# Patient Record
Sex: MALE | Race: WHITE | Employment: OTHER | URBAN - METROPOLITAN AREA
[De-identification: names, ages, dates, MRNs, and addresses within clinical notes are randomized per-mention and may not be internally consistent; named-entity substitution may affect disease eponyms.]

---

## 2018-09-24 ENCOUNTER — HOSPITAL ENCOUNTER (OUTPATIENT)
Dept: CT IMAGING | Facility: HOSPITAL | Age: 78
Discharge: HOME | End: 2018-09-24
Attending: PHYSICIAN ASSISTANT
Payer: MEDICARE

## 2018-09-24 DIAGNOSIS — M25.561 PAIN IN RIGHT KNEE: ICD-10-CM

## 2018-09-24 DIAGNOSIS — M17.11 UNILATERAL PRIMARY OSTEOARTHRITIS, RIGHT KNEE: ICD-10-CM

## 2018-09-24 PROCEDURE — 73700 CT LOWER EXTREMITY W/O DYE: CPT | Performed by: PHYSICIAN ASSISTANT

## 2018-10-25 RX ORDER — MULTIVIT-MIN/IRON/FOLIC ACID/K 18-600-40
1 CAPSULE ORAL DAILY
COMMUNITY

## 2018-10-26 ENCOUNTER — LAB ENCOUNTER (OUTPATIENT)
Dept: LAB | Facility: HOSPITAL | Age: 78
End: 2018-10-26
Attending: ORTHOPAEDIC SURGERY
Payer: MEDICARE

## 2018-10-26 DIAGNOSIS — Z01.818 PREOP TESTING: ICD-10-CM

## 2018-10-26 PROCEDURE — 86901 BLOOD TYPING SEROLOGIC RH(D): CPT

## 2018-10-26 PROCEDURE — 87641 MR-STAPH DNA AMP PROBE: CPT

## 2018-10-26 PROCEDURE — 86900 BLOOD TYPING SEROLOGIC ABO: CPT

## 2018-10-26 PROCEDURE — 36415 COLL VENOUS BLD VENIPUNCTURE: CPT

## 2018-10-26 PROCEDURE — 86850 RBC ANTIBODY SCREEN: CPT

## 2018-11-03 ENCOUNTER — LAB ENCOUNTER (OUTPATIENT)
Dept: LAB | Facility: HOSPITAL | Age: 78
DRG: 470 | End: 2018-11-03
Attending: ORTHOPAEDIC SURGERY
Payer: MEDICARE

## 2018-11-03 DIAGNOSIS — Z01.818 PREOP TESTING: ICD-10-CM

## 2018-11-03 PROCEDURE — 85025 COMPLETE CBC W/AUTO DIFF WBC: CPT

## 2018-11-03 PROCEDURE — 80053 COMPREHEN METABOLIC PANEL: CPT

## 2018-11-03 PROCEDURE — 36415 COLL VENOUS BLD VENIPUNCTURE: CPT

## 2018-11-05 ENCOUNTER — HOSPITAL ENCOUNTER (INPATIENT)
Facility: HOSPITAL | Age: 78
LOS: 7 days | Discharge: NURSING, REHAB OR LONG-TERM CARE | DRG: 470 | End: 2018-11-12
Attending: ORTHOPAEDIC SURGERY | Admitting: ORTHOPAEDIC SURGERY
Payer: MEDICARE

## 2018-11-05 ENCOUNTER — ANESTHESIA EVENT (OUTPATIENT)
Dept: SURGERY | Facility: HOSPITAL | Age: 78
DRG: 470 | End: 2018-11-05
Payer: MEDICARE

## 2018-11-05 ENCOUNTER — APPOINTMENT (OUTPATIENT)
Dept: GENERAL RADIOLOGY | Facility: HOSPITAL | Age: 78
DRG: 470 | End: 2018-11-05
Attending: ORTHOPAEDIC SURGERY
Payer: MEDICARE

## 2018-11-05 ENCOUNTER — ANESTHESIA (OUTPATIENT)
Dept: SURGERY | Facility: HOSPITAL | Age: 78
DRG: 470 | End: 2018-11-05
Payer: MEDICARE

## 2018-11-05 DIAGNOSIS — Z01.818 PREOP TESTING: Primary | ICD-10-CM

## 2018-11-05 DIAGNOSIS — M17.11 OSTEOARTHRITIS OF RIGHT KNEE: ICD-10-CM

## 2018-11-05 PROCEDURE — 73560 X-RAY EXAM OF KNEE 1 OR 2: CPT | Performed by: ORTHOPAEDIC SURGERY

## 2018-11-05 PROCEDURE — 0SRC0J9 REPLACEMENT OF RIGHT KNEE JOINT WITH SYNTHETIC SUBSTITUTE, CEMENTED, OPEN APPROACH: ICD-10-PCS | Performed by: ORTHOPAEDIC SURGERY

## 2018-11-05 DEVICE — P.F.C. SIGMA OVAL DOME PATELLA 3-PEG 41MM CEMENTED
Type: IMPLANTABLE DEVICE | Site: KNEE | Status: FUNCTIONAL
Brand: P.F.C. SIGMA

## 2018-11-05 RX ORDER — POLYETHYLENE GLYCOL 3350 17 G/17G
17 POWDER, FOR SOLUTION ORAL DAILY PRN
Status: DISCONTINUED | OUTPATIENT
Start: 2018-11-05 | End: 2018-11-12

## 2018-11-05 RX ORDER — DOCUSATE SODIUM 100 MG/1
100 CAPSULE, LIQUID FILLED ORAL 2 TIMES DAILY
Status: DISCONTINUED | OUTPATIENT
Start: 2018-11-05 | End: 2018-11-12

## 2018-11-05 RX ORDER — CYCLOBENZAPRINE HCL 10 MG
10 TABLET ORAL EVERY 8 HOURS PRN
Status: DISCONTINUED | OUTPATIENT
Start: 2018-11-05 | End: 2018-11-08

## 2018-11-05 RX ORDER — ATORVASTATIN CALCIUM 40 MG/1
40 TABLET, FILM COATED ORAL DAILY
Status: DISCONTINUED | OUTPATIENT
Start: 2018-11-05 | End: 2018-11-12

## 2018-11-05 RX ORDER — VENLAFAXINE HYDROCHLORIDE 150 MG/1
150 CAPSULE, EXTENDED RELEASE ORAL DAILY
Status: DISCONTINUED | OUTPATIENT
Start: 2018-11-05 | End: 2018-11-12

## 2018-11-05 RX ORDER — HALOPERIDOL 5 MG/ML
0.25 INJECTION INTRAMUSCULAR ONCE AS NEEDED
Status: DISCONTINUED | OUTPATIENT
Start: 2018-11-05 | End: 2018-11-05 | Stop reason: HOSPADM

## 2018-11-05 RX ORDER — ONDANSETRON 2 MG/ML
INJECTION INTRAMUSCULAR; INTRAVENOUS AS NEEDED
Status: DISCONTINUED | OUTPATIENT
Start: 2018-11-05 | End: 2018-11-05 | Stop reason: SURG

## 2018-11-05 RX ORDER — HYDROCODONE BITARTRATE AND ACETAMINOPHEN 5; 325 MG/1; MG/1
1 TABLET ORAL AS NEEDED
Status: DISCONTINUED | OUTPATIENT
Start: 2018-11-05 | End: 2018-11-05 | Stop reason: HOSPADM

## 2018-11-05 RX ORDER — SODIUM CHLORIDE 9 MG/ML
INJECTION, SOLUTION INTRAVENOUS CONTINUOUS
Status: DISCONTINUED | OUTPATIENT
Start: 2018-11-05 | End: 2018-11-06

## 2018-11-05 RX ORDER — MORPHINE SULFATE 4 MG/ML
4 INJECTION, SOLUTION INTRAMUSCULAR; INTRAVENOUS EVERY 10 MIN PRN
Status: DISCONTINUED | OUTPATIENT
Start: 2018-11-05 | End: 2018-11-05 | Stop reason: HOSPADM

## 2018-11-05 RX ORDER — METOCLOPRAMIDE HYDROCHLORIDE 5 MG/ML
10 INJECTION INTRAMUSCULAR; INTRAVENOUS EVERY 6 HOURS PRN
Status: ACTIVE | OUTPATIENT
Start: 2018-11-05 | End: 2018-11-07

## 2018-11-05 RX ORDER — FAMOTIDINE 10 MG/ML
20 INJECTION, SOLUTION INTRAVENOUS 2 TIMES DAILY
Status: DISCONTINUED | OUTPATIENT
Start: 2018-11-05 | End: 2018-11-12

## 2018-11-05 RX ORDER — SODIUM PHOSPHATE, DIBASIC AND SODIUM PHOSPHATE, MONOBASIC 7; 19 G/133ML; G/133ML
1 ENEMA RECTAL ONCE AS NEEDED
Status: DISCONTINUED | OUTPATIENT
Start: 2018-11-05 | End: 2018-11-12

## 2018-11-05 RX ORDER — NALOXONE HYDROCHLORIDE 0.4 MG/ML
80 INJECTION, SOLUTION INTRAMUSCULAR; INTRAVENOUS; SUBCUTANEOUS AS NEEDED
Status: DISCONTINUED | OUTPATIENT
Start: 2018-11-05 | End: 2018-11-05 | Stop reason: HOSPADM

## 2018-11-05 RX ORDER — ROCURONIUM BROMIDE 10 MG/ML
INJECTION, SOLUTION INTRAVENOUS AS NEEDED
Status: DISCONTINUED | OUTPATIENT
Start: 2018-11-05 | End: 2018-11-05 | Stop reason: SURG

## 2018-11-05 RX ORDER — BUPIVACAINE HYDROCHLORIDE AND EPINEPHRINE 2.5; 5 MG/ML; UG/ML
INJECTION, SOLUTION INFILTRATION; PERINEURAL AS NEEDED
Status: DISCONTINUED | OUTPATIENT
Start: 2018-11-05 | End: 2018-11-05 | Stop reason: HOSPADM

## 2018-11-05 RX ORDER — SODIUM CHLORIDE, SODIUM LACTATE, POTASSIUM CHLORIDE, CALCIUM CHLORIDE 600; 310; 30; 20 MG/100ML; MG/100ML; MG/100ML; MG/100ML
INJECTION, SOLUTION INTRAVENOUS CONTINUOUS
Status: DISCONTINUED | OUTPATIENT
Start: 2018-11-05 | End: 2018-11-05 | Stop reason: HOSPADM

## 2018-11-05 RX ORDER — ONDANSETRON 2 MG/ML
4 INJECTION INTRAMUSCULAR; INTRAVENOUS ONCE AS NEEDED
Status: DISCONTINUED | OUTPATIENT
Start: 2018-11-05 | End: 2018-11-05 | Stop reason: HOSPADM

## 2018-11-05 RX ORDER — DIPHENHYDRAMINE HYDROCHLORIDE 50 MG/ML
12.5 INJECTION INTRAMUSCULAR; INTRAVENOUS EVERY 4 HOURS PRN
Status: DISCONTINUED | OUTPATIENT
Start: 2018-11-05 | End: 2018-11-08

## 2018-11-05 RX ORDER — SODIUM CHLORIDE, SODIUM LACTATE, POTASSIUM CHLORIDE, CALCIUM CHLORIDE 600; 310; 30; 20 MG/100ML; MG/100ML; MG/100ML; MG/100ML
INJECTION, SOLUTION INTRAVENOUS CONTINUOUS
Status: DISCONTINUED | OUTPATIENT
Start: 2018-11-05 | End: 2018-11-06

## 2018-11-05 RX ORDER — MORPHINE SULFATE 4 MG/ML
2 INJECTION, SOLUTION INTRAMUSCULAR; INTRAVENOUS EVERY 10 MIN PRN
Status: DISCONTINUED | OUTPATIENT
Start: 2018-11-05 | End: 2018-11-05 | Stop reason: HOSPADM

## 2018-11-05 RX ORDER — VANCOMYCIN HYDROCHLORIDE 1 G/20ML
INJECTION, POWDER, LYOPHILIZED, FOR SOLUTION INTRAVENOUS AS NEEDED
Status: DISCONTINUED | OUTPATIENT
Start: 2018-11-05 | End: 2018-11-05 | Stop reason: HOSPADM

## 2018-11-05 RX ORDER — GABAPENTIN 300 MG/1
600 CAPSULE ORAL ONCE
Status: COMPLETED | OUTPATIENT
Start: 2018-11-05 | End: 2018-11-05

## 2018-11-05 RX ORDER — SCOLOPAMINE TRANSDERMAL SYSTEM 1 MG/1
1 PATCH, EXTENDED RELEASE TRANSDERMAL ONCE
Status: DISCONTINUED | OUTPATIENT
Start: 2018-11-05 | End: 2018-11-08

## 2018-11-05 RX ORDER — CEFAZOLIN SODIUM/WATER 2 G/20 ML
2 SYRINGE (ML) INTRAVENOUS ONCE
Status: COMPLETED | OUTPATIENT
Start: 2018-11-05 | End: 2018-11-05

## 2018-11-05 RX ORDER — FAMOTIDINE 20 MG/1
20 TABLET ORAL ONCE
Status: COMPLETED | OUTPATIENT
Start: 2018-11-05 | End: 2018-11-05

## 2018-11-05 RX ORDER — LIDOCAINE HYDROCHLORIDE 10 MG/ML
INJECTION, SOLUTION EPIDURAL; INFILTRATION; INTRACAUDAL; PERINEURAL AS NEEDED
Status: DISCONTINUED | OUTPATIENT
Start: 2018-11-05 | End: 2018-11-05 | Stop reason: SURG

## 2018-11-05 RX ORDER — ONDANSETRON 2 MG/ML
4 INJECTION INTRAMUSCULAR; INTRAVENOUS EVERY 4 HOURS PRN
Status: DISCONTINUED | OUTPATIENT
Start: 2018-11-05 | End: 2018-11-12

## 2018-11-05 RX ORDER — SODIUM CHLORIDE 0.9 % (FLUSH) 0.9 %
10 SYRINGE (ML) INJECTION AS NEEDED
Status: DISCONTINUED | OUTPATIENT
Start: 2018-11-05 | End: 2018-11-12

## 2018-11-05 RX ORDER — CEFAZOLIN SODIUM/WATER 2 G/20 ML
2 SYRINGE (ML) INTRAVENOUS EVERY 8 HOURS
Status: COMPLETED | OUTPATIENT
Start: 2018-11-05 | End: 2018-11-06

## 2018-11-05 RX ORDER — BISACODYL 10 MG
10 SUPPOSITORY, RECTAL RECTAL
Status: DISCONTINUED | OUTPATIENT
Start: 2018-11-05 | End: 2018-11-12

## 2018-11-05 RX ORDER — MORPHINE SULFATE 10 MG/ML
6 INJECTION, SOLUTION INTRAMUSCULAR; INTRAVENOUS EVERY 10 MIN PRN
Status: DISCONTINUED | OUTPATIENT
Start: 2018-11-05 | End: 2018-11-05 | Stop reason: HOSPADM

## 2018-11-05 RX ORDER — ACETAMINOPHEN 325 MG/1
650 TABLET ORAL EVERY 4 HOURS PRN
Status: DISCONTINUED | OUTPATIENT
Start: 2018-11-05 | End: 2018-11-12

## 2018-11-05 RX ORDER — HYDROCODONE BITARTRATE AND ACETAMINOPHEN 7.5; 325 MG/1; MG/1
2 TABLET ORAL EVERY 4 HOURS PRN
Status: DISCONTINUED | OUTPATIENT
Start: 2018-11-05 | End: 2018-11-06

## 2018-11-05 RX ORDER — DEXAMETHASONE SODIUM PHOSPHATE 4 MG/ML
VIAL (ML) INJECTION AS NEEDED
Status: DISCONTINUED | OUTPATIENT
Start: 2018-11-05 | End: 2018-11-05 | Stop reason: SURG

## 2018-11-05 RX ORDER — HYDROCODONE BITARTRATE AND ACETAMINOPHEN 7.5; 325 MG/1; MG/1
1 TABLET ORAL EVERY 4 HOURS PRN
Status: DISCONTINUED | OUTPATIENT
Start: 2018-11-05 | End: 2018-11-06

## 2018-11-05 RX ORDER — HYDROCODONE BITARTRATE AND ACETAMINOPHEN 5; 325 MG/1; MG/1
2 TABLET ORAL AS NEEDED
Status: DISCONTINUED | OUTPATIENT
Start: 2018-11-05 | End: 2018-11-05 | Stop reason: HOSPADM

## 2018-11-05 RX ORDER — DIPHENHYDRAMINE HYDROCHLORIDE 50 MG/ML
25 INJECTION INTRAMUSCULAR; INTRAVENOUS ONCE AS NEEDED
Status: ACTIVE | OUTPATIENT
Start: 2018-11-05 | End: 2018-11-05

## 2018-11-05 RX ORDER — ATORVASTATIN CALCIUM 40 MG/1
40 TABLET, FILM COATED ORAL DAILY
COMMUNITY

## 2018-11-05 RX ORDER — HYDROMORPHONE HYDROCHLORIDE 1 MG/ML
0.5 INJECTION, SOLUTION INTRAMUSCULAR; INTRAVENOUS; SUBCUTANEOUS AS NEEDED
Status: DISCONTINUED | OUTPATIENT
Start: 2018-11-05 | End: 2018-11-05 | Stop reason: HOSPADM

## 2018-11-05 RX ORDER — ACETAMINOPHEN 500 MG
1000 TABLET ORAL ONCE
Status: DISCONTINUED | OUTPATIENT
Start: 2018-11-05 | End: 2018-11-05

## 2018-11-05 RX ORDER — DIPHENHYDRAMINE HCL 25 MG
25 CAPSULE ORAL EVERY 4 HOURS PRN
Status: DISCONTINUED | OUTPATIENT
Start: 2018-11-05 | End: 2018-11-08

## 2018-11-05 RX ORDER — MAGNESIUM HYDROXIDE 1200 MG/15ML
LIQUID ORAL CONTINUOUS PRN
Status: DISCONTINUED | OUTPATIENT
Start: 2018-11-05 | End: 2018-11-05 | Stop reason: HOSPADM

## 2018-11-05 RX ORDER — PHENYLEPHRINE HCL 10 MG/ML
VIAL (ML) INJECTION AS NEEDED
Status: DISCONTINUED | OUTPATIENT
Start: 2018-11-05 | End: 2018-11-05 | Stop reason: SURG

## 2018-11-05 RX ORDER — FAMOTIDINE 20 MG/1
20 TABLET ORAL 2 TIMES DAILY
Status: DISCONTINUED | OUTPATIENT
Start: 2018-11-05 | End: 2018-11-12

## 2018-11-05 RX ORDER — EPHEDRINE SULFATE 50 MG/ML
INJECTION, SOLUTION INTRAVENOUS AS NEEDED
Status: DISCONTINUED | OUTPATIENT
Start: 2018-11-05 | End: 2018-11-05 | Stop reason: SURG

## 2018-11-05 RX ORDER — ACETAMINOPHEN 500 MG
1000 TABLET ORAL ONCE
Status: COMPLETED | OUTPATIENT
Start: 2018-11-05 | End: 2018-11-05

## 2018-11-05 RX ADMIN — EPHEDRINE SULFATE 10 MG: 50 INJECTION, SOLUTION INTRAVENOUS at 13:53:00

## 2018-11-05 RX ADMIN — SODIUM CHLORIDE, SODIUM LACTATE, POTASSIUM CHLORIDE, CALCIUM CHLORIDE: 600; 310; 30; 20 INJECTION, SOLUTION INTRAVENOUS at 15:02:00

## 2018-11-05 RX ADMIN — LIDOCAINE HYDROCHLORIDE 50 MG: 10 INJECTION, SOLUTION EPIDURAL; INFILTRATION; INTRACAUDAL; PERINEURAL at 13:41:00

## 2018-11-05 RX ADMIN — CEFAZOLIN SODIUM/WATER 2 G: 2 G/20 ML SYRINGE (ML) INTRAVENOUS at 13:47:00

## 2018-11-05 RX ADMIN — DEXAMETHASONE SODIUM PHOSPHATE 4 MG: 4 MG/ML VIAL (ML) INJECTION at 13:41:00

## 2018-11-05 RX ADMIN — ONDANSETRON 4 MG: 2 INJECTION INTRAMUSCULAR; INTRAVENOUS at 13:41:00

## 2018-11-05 RX ADMIN — SODIUM CHLORIDE, SODIUM LACTATE, POTASSIUM CHLORIDE, CALCIUM CHLORIDE: 600; 310; 30; 20 INJECTION, SOLUTION INTRAVENOUS at 13:37:00

## 2018-11-05 RX ADMIN — PHENYLEPHRINE HCL 100 MCG: 10 MG/ML VIAL (ML) INJECTION at 14:00:00

## 2018-11-05 RX ADMIN — ROCURONIUM BROMIDE 10 MG: 10 INJECTION, SOLUTION INTRAVENOUS at 13:41:00

## 2018-11-05 RX ADMIN — EPHEDRINE SULFATE 10 MG: 50 INJECTION, SOLUTION INTRAVENOUS at 14:36:00

## 2018-11-05 RX ADMIN — EPHEDRINE SULFATE 15 MG: 50 INJECTION, SOLUTION INTRAVENOUS at 13:56:00

## 2018-11-05 RX ADMIN — PHENYLEPHRINE HCL 100 MCG: 10 MG/ML VIAL (ML) INJECTION at 14:38:00

## 2018-11-05 NOTE — OPERATIVE REPORT
DATE OF SURGERY: 11/05/2018  Operative Note  Surgeon: Nick Nicholson MD  Anesthesia Type:  General  Pre-Op Diagnosis:     (1) Unilateral primary osteoarthritis, right knee  Post-Op Diagnosis:     (1) Unilateral primary osteoarthritis, right knee     Procedure knee and noted severe arthritis throughout the knee. We then exposed the distal femur, placed our custom cutting guide, drilled it and pinned it in place and then resected the distal femur, sizing it to a 6.   We then placed the four-in-one cutting guide a

## 2018-11-05 NOTE — ANESTHESIA PROCEDURE NOTES
ANESTHESIA INTUBATION  Date/Time: 11/5/2018 1:48 PM  Urgency: elective    Airway not difficult    General Information and Staff    Patient location during procedure: OR  Anesthesiologist: Modesto Morse DO  Resident/CRNA: Darwin Angel CRNA  Performed: Pia Mcintyre

## 2018-11-05 NOTE — ANESTHESIA POSTPROCEDURE EVALUATION
Patient: Perri Yang    Procedure Summary     Date:  11/05/18 Room / Location:  [address] [place] [address] MAIN OR 11 / [address] [place] [address] OR    Anesthesia Start:  3838 Anesthesia Stop:  1474    Procedure:  KNEE TOTAL REPLACEMENT (Right Knee) Diagnosis:  (Right knee osteoarthritis)

## 2018-11-05 NOTE — INTERVAL H&P NOTE
Pre-op Diagnosis: Right knee osteoarthritis    The above referenced H&P was reviewed by Hilda Elizalde MD on 11/5/2018, the patient was examined and no significant changes have occurred in the patient's condition since the H&P was performed.   I discussed with

## 2018-11-05 NOTE — ANESTHESIA PREPROCEDURE EVALUATION
Anesthesia PreOp Note    HPI:     Leticia Bueno is a 70year old male who presents for preoperative consultation requested by: Bina Eaton MD    Date of Surgery: 11/5/2018    Procedure(s):  KNEE TOTAL REPLACEMENT  Indication: Right knee osteoarthritis (ANCEF/KEFZOL) 2 GM/20ML premix IV syringe 2 g 2 g Intravenous Once Trish Deleon MD   Povidone-Iodine 10 % 17.5 mL in sodium chloride 0.9 % 500 mL irrigation  Irrigation Once Trish Deleon MD   EPINEPHrine (ADRENALIN) 1 MG/ML 0.5 mg, ketorolac tromethamine CA 8.5 11/03/2018          Vital Signs: Body mass index is 27.93 kg/m². height is 1.899 m (6' 2.75\") and weight is 100.7 kg (222 lb). His oral temperature is 98.1 °F (36.7 °C). His blood pressure is 129/67 and his pulse is 71.  His respiration is 15 and

## 2018-11-05 NOTE — INTERVAL H&P NOTE
Pre-op Diagnosis: Right knee osteoarthritis    The above referenced H&P was reviewed by Luz Elena Lang MD on 11/5/2018, the patient was examined and no significant changes have occurred in the patient's condition since the H&P was performed.   I discussed with

## 2018-11-06 RX ORDER — HYDROCODONE BITARTRATE AND ACETAMINOPHEN 10; 325 MG/1; MG/1
2 TABLET ORAL EVERY 4 HOURS PRN
Status: DISCONTINUED | OUTPATIENT
Start: 2018-11-06 | End: 2018-11-08

## 2018-11-06 RX ORDER — HYDROCODONE BITARTRATE AND ACETAMINOPHEN 10; 325 MG/1; MG/1
1 TABLET ORAL EVERY 4 HOURS PRN
Status: DISCONTINUED | OUTPATIENT
Start: 2018-11-06 | End: 2018-11-08

## 2018-11-06 RX ORDER — ALFUZOSIN HYDROCHLORIDE 10 MG/1
10 TABLET, EXTENDED RELEASE ORAL NIGHTLY
Status: DISCONTINUED | OUTPATIENT
Start: 2018-11-06 | End: 2018-11-06

## 2018-11-06 RX ORDER — ALFUZOSIN HYDROCHLORIDE 10 MG/1
10 TABLET, EXTENDED RELEASE ORAL ONCE
Status: COMPLETED | OUTPATIENT
Start: 2018-11-06 | End: 2018-11-06

## 2018-11-06 RX ORDER — ALFUZOSIN HYDROCHLORIDE 10 MG/1
10 TABLET, EXTENDED RELEASE ORAL NIGHTLY
Status: DISCONTINUED | OUTPATIENT
Start: 2018-11-07 | End: 2018-11-12

## 2018-11-06 RX ORDER — TRAMADOL HYDROCHLORIDE 50 MG/1
50 TABLET ORAL EVERY 12 HOURS PRN
Status: DISCONTINUED | OUTPATIENT
Start: 2018-11-06 | End: 2018-11-08

## 2018-11-06 NOTE — PROGRESS NOTES
[facility]    Progress Note    Sumanth Yoder Patient Status:  Inpatient    1940 MRN [id number]   Location [facility] 4W/SW/SE Attending Joselyn Sanches MD   [facility] Day # 1 PCP Candice Stevens DO       Subjective:   Sumanth Yoder Status post right total knee arthroplasty    Dictated by (CST): Mario Pulido MD on 11/05/2018 at 16:22     Approved by (CST): Mario Pulido MD on 11/05/2018 at 16:23                  Arturo Acosta MD  11/6/2018

## 2018-11-06 NOTE — CM/SW NOTE
RAFAEL met with the patient at bedside. The patient lives with his spouse in 2 level home(stays on the main level). The patient responds being independent prior to admission with all ADL's and ambulation; he does not  drive. Patient has been using a cane.  Daria

## 2018-11-06 NOTE — PHYSICAL THERAPY NOTE
PHYSICAL THERAPY KNEE TREATMENT NOTE - INPATIENT     Room Number: 715/139-Q             Presenting Problem: R TKA    Problem List  Active Problems:    Preop testing      ASSESSMENT   Patient sitting in bedside chair at start of session;  Family present in Restriction: R lower extremity        R Lower Extremity: Weight Bearing as Tolerated       PAIN ASSESSMENT   Ratin  Location: R knee  Management Techniques:  Activity promotion    BALANCE  Static Sitting: Fair +  Dynamic Sitting: Fair  Static Standing: supine - sit EOB @ level: supervision      Goal #1   Current Status  Min A x 1   Goal #2 Patient is able to demonstrate transfers Sit to/from Stand at assistance level: Supervision      Goal #2  Current Status  Mod A x 2   Goal #3 Patient is able to [employer]

## 2018-11-06 NOTE — H&P
[facility]    PATIENT'S NAME: Romelia Riddle   ATTENDING PHYSICIAN: Ivis Jain MD   PATIENT ACCOUNT#:   [id number]    LOCATION:  [address] [place] [address],[facility] 201 #:   [id number]       YOB: 1940  ADMISSION DATE:       11/05/2018 degrees Fahrenheit, O2 saturation 94% on 2L. HEENT:  Pupils equally reactive, round, to light. Extraocular movements were intact. Mucosa was moist.  NECK:  No masses. LUNGS:  Clear to auscultation and percussion. HEART:  Regular rate and rhythm.   S1 a

## 2018-11-06 NOTE — OCCUPATIONAL THERAPY NOTE
OCCUPATIONAL THERAPY EVALUATION - INPATIENT      Room Number: 627/095-Q  Evaluation Date: 11/6/2018  Type of Evaluation: Initial       Physician Order: IP Consult to Occupational Therapy  Reason for Therapy: ADL/IADL Dysfunction and Discharge Planning    O technique education       OCCUPATIONAL THERAPY MEDICAL/SOCIAL HISTORY     Problem List   Active Problems:    Preop testing      Past Medical History  Past Medical History:   Diagnosis Date   • Calculus of kidney    • Depression    • Diverticulitis    • Mac extremity strength is within functional limits      ACTIVITIES OF DAILY LIVING ASSESSMENT  AM-PAC ‘6-Clicks’ Inpatient Daily Activity Short Form  How much help from another person does the patient currently need…  -   Putting on and taking off regular lowe

## 2018-11-06 NOTE — CONSULTS
[facility]    Report of Consultation    Perri Yang Patient Status:  Inpatient    1940 MRN [id number]   Location [facility] 4W/SW/SE Attending Rosey Kaplan MD   Hosp Day # 1 PCP Darwin Franz DO     Date of Admission:  11 IV bolus 500 mL 500 mL Intravenous Once PRN   0.9%  NaCl infusion  Intravenous Continuous   Cyclobenzaprine HCl (cyclobenzaprine) tab 10 mg 10 mg Oral Q8H PRN   docusate sodium (COLACE) cap 100 mg 100 mg Oral BID   PEG 3350 (MIRALAX) powder packet 17 g 17 2694 [I.V.:2574; IV PIGGYBACK:120]  Out: 445 [Urine:420; Blood:25]   This shift: I/O this shift:  In: -   Out: 100 [Urine:100]     Vent Settings:      Hemodynamic parameters (last 24 hours):      Scheduled Meds:   • scopolamine  1 patch Transdermal Once cardiologist monitoring as outpatient. HTN was well controlled off HTN meds. Thank you for allowing me to participate in the care of your patient.     Michelle Carney  11/6/2018

## 2018-11-06 NOTE — PHYSICAL THERAPY NOTE
PHYSICAL THERAPY KNEE EVALUATION - INPATIENT       Room Number: 228/738-X  Evaluation Date: 11/6/2018  Type of Evaluation: Initial  Physician Order: PT Eval and Treat    Presenting Problem: R TKA  Reason for Therapy: Mobility Dysfunction and Discharge Plan LE's elevated on pillow): 104/50mmHg  RN MADE AWARE**    Patient will benefit from continued IP PT services to address these deficits in preparation for discharge.     DISCHARGE RECOMMENDATIONS  PT Discharge Recommendations: Home with home health PT;24 hour limits    RANGE OF MOTION AND STRENGTH ASSESSMENT  Upper extremity ROM and strength are within functional limits     Lower extremity ROM is within functional limits except for the following:   Right Knee extension 10  Right Knee flexion 90    Lower extremi slides  Quad sets  Transfer training    Patient End of Session: Up in chair;Call light within reach;RN aware of session/findings; Family present    CURRENT GOALS    Goals to be met by: 11/20/18  Patient Goal Patient's self-stated goal is: Return home   Goal

## 2018-11-06 NOTE — PROGRESS NOTES
[address] Patient Status:  Inpatient    1940 MRN [id number]   Location [facility] 4W/SW/SE Attending Sera Hoskins MD   Hosp Day # 1 PCP Tomas Espinoza DO     Subjective:  Post-Operative Day: 1 Status Post right Tota (PEPCID) tab 20 mg 20 mg Oral BID   Or      famoTIDine (PEPCID) injection 20 mg 20 mg Intravenous BID   diphenhydrAMINE (BENADRYL) cap/tab 25 mg 25 mg Oral Q4H PRN   Or      DiphenhydrAMINE HCl (BENADRYL) injection 12.5 mg 12.5 mg Intravenous Q4H PRN   api

## 2018-11-07 RX ORDER — FAMOTIDINE 20 MG/1
20 TABLET ORAL 2 TIMES DAILY
Qty: 60 TABLET | Refills: 0 | Status: SHIPPED | OUTPATIENT
Start: 2018-11-07

## 2018-11-07 RX ORDER — DEXTROSE AND SODIUM CHLORIDE 5; .45 G/100ML; G/100ML
INJECTION, SOLUTION INTRAVENOUS CONTINUOUS
Status: DISCONTINUED | OUTPATIENT
Start: 2018-11-07 | End: 2018-11-10

## 2018-11-07 RX ORDER — SODIUM CHLORIDE 9 MG/ML
INJECTION, SOLUTION INTRAVENOUS CONTINUOUS
Status: DISCONTINUED | OUTPATIENT
Start: 2018-11-07 | End: 2018-11-10

## 2018-11-07 RX ORDER — TRAMADOL HYDROCHLORIDE 50 MG/1
50 TABLET ORAL EVERY 12 HOURS PRN
Qty: 60 TABLET | Refills: 0 | Status: SHIPPED | OUTPATIENT
Start: 2018-11-07 | End: 2018-11-12

## 2018-11-07 NOTE — CM/SW NOTE
Referral sent to [facility]. DON screen requested.       Titi Whittaker, MSW., [id number]

## 2018-11-07 NOTE — PHYSICAL THERAPY NOTE
PHYSICAL THERAPY TREATMENT NOTE - INPATIENT     Room Number: 597/327-K       Presenting Problem: R TKA    Problem List  Active Problems:    Preop testing      ASSESSMENT   Patient received sitting in bedside chair; family present in room.  Per RN, pt rose Lower Extremity: Weight Bearing as Tolerated       PAIN ASSESSMENT   Ratin  Location: R knee with movement. 0/10 at rest  Management Techniques:  Activity promotion;Repositioning    BALANCE supine - sit EOB @ level: supervision      Goal #1   Current Status  NT   Goal #2 Patient is able to demonstrate transfers Sit to/from Stand at assistance level: Supervision      Goal #2  Current Status  Mod A x 2/Max A x 2   Goal #3 Patient is able to amb

## 2018-11-07 NOTE — CM/SW NOTE
RAFAEL met with the patient, his wife and his daughter at bedside and presented the list of rehab facilities. The wife wants to tour the facilities in [facility] this afternoon before they can made a decision of rehab facility.      RAFAEL will continue to follo

## 2018-11-07 NOTE — PLAN OF CARE
Delirium    • Minimize duration of delirium Not Progressing          DISCHARGE PLANNING    • Discharge to home or other facility with appropriate resources Progressing        PAIN - ADULT    • Verbalizes/displays adequate comfort level or patient's stated

## 2018-11-07 NOTE — PROGRESS NOTES
[facility]    Progress Note    Amber Marte Patient Status:  Inpatient    1940 MRN [id number]   Location [facility] 4W/SW/SE Attending Gosia Yang MD   [facility] Day [facility] PCP Shannan Hpoe DO       Subjective:   Amber Marte (L) 11/07/2018    CREATSERUM 1.27 11/06/2018    BUN 22 (H) 11/06/2018     11/06/2018    K 4.4 11/06/2018     11/06/2018    CO2 22 11/06/2018     (H) 11/06/2018    CA 8.1 (L) 11/06/2018    ALB 3.4 (L) 11/03/2018    ALKPHO 136 (H) 11/03/20

## 2018-11-07 NOTE — PHYSICAL THERAPY NOTE
PHYSICAL THERAPY KNEE TREATMENT NOTE - INPATIENT     Room Number: 979/153-C             Presenting Problem: R TKA    Problem List  Active Problems:    Preop testing      ASSESSMENT   Patient received sitting EOB with OT present- family in room.  Patient agr TOLERANCE  Pulse: ()  Heart Rate Source: Monitor     BP: 121/56  BP Location: Left arm  BP Method: Automatic  Patient Position: Sitting    O2 WALK                  AM-PAC '6-Clicks' INPATIENT SHORT FORM - BASIC MOBILITY  How much difficulty does the #4   Current Status  NT   Goal #5  AROM 0 degrees extension to 95 degrees flexion     Goal #5   Current Status  ongoing   Goal #6 Patient independently performs home exercise program for ROM/strengthening per the instructions provided in preparation for [facility]

## 2018-11-07 NOTE — OCCUPATIONAL THERAPY NOTE
OCCUPATIONAL THERAPY TREATMENT NOTE - INPATIENT    Room Number: 298/191-I         Presenting Problem: R TKA     Problem List  Active Problems:    Preop testing      ASSESSMENT   RN provided consent to proceed; pt continues to present with poor tolerance fo home\"    OBJECTIVE  Precautions: (+ Orthostatic)    WEIGHT BEARING RESTRICTION  Weight Bearing Restriction: R lower extremity        R Lower Extremity: Weight Bearing as Tolerated       PAIN ASSESSMENT  Ratin  Location: RLE  Management Techniques:  Act A      Comment:         Goals  on:  11/15/18  Frequency: 3-5x week    Kalpana Edmond, OTR/L   5 [facility]

## 2018-11-07 NOTE — PLAN OF CARE
Delirium    • Minimize duration of delirium Progressing        DISCHARGE PLANNING    • Discharge to home or other facility with appropriate resources Progressing        PAIN - ADULT    • Verbalizes/displays adequate comfort level or patient's stated pain g

## 2018-11-07 NOTE — PROGRESS NOTES
[address] Patient Status:  Inpatient    1940 MRN [id number]   Location [facility] 4W/SW/SE Attending Steven Hernandez MD   [facility] Day # 2 PCP Perri Huizar DO     Subjective:  Post-Operative Day: 2 Status Post right Tota 10 mg 10 mg Intravenous Q6H PRN   Prochlorperazine Edisylate (COMPAZINE) injection 10 mg 10 mg Intravenous Q6H PRN   famoTIDine (PEPCID) tab 20 mg 20 mg Oral BID   Or      famoTIDine (PEPCID) injection 20 mg 20 mg Intravenous BID   diphenhydrAMINE (BENADRY

## 2018-11-08 ENCOUNTER — APPOINTMENT (OUTPATIENT)
Dept: CT IMAGING | Facility: HOSPITAL | Age: 78
DRG: 470 | End: 2018-11-08
Attending: HOSPITALIST
Payer: MEDICARE

## 2018-11-08 PROCEDURE — 90792 PSYCH DIAG EVAL W/MED SRVCS: CPT | Performed by: OTHER

## 2018-11-08 RX ORDER — HALOPERIDOL 5 MG/ML
1 INJECTION INTRAMUSCULAR EVERY 2 HOUR PRN
Status: DISCONTINUED | OUTPATIENT
Start: 2018-11-08 | End: 2018-11-12

## 2018-11-08 RX ORDER — ZIPRASIDONE MESYLATE 20 MG/ML
INJECTION, POWDER, LYOPHILIZED, FOR SOLUTION INTRAMUSCULAR
Status: DISPENSED
Start: 2018-11-08 | End: 2018-11-08

## 2018-11-08 RX ORDER — HALOPERIDOL 5 MG/ML
5 INJECTION INTRAMUSCULAR ONCE
Status: COMPLETED | OUTPATIENT
Start: 2018-11-08 | End: 2018-11-08

## 2018-11-08 RX ORDER — LORAZEPAM 2 MG/ML
INJECTION INTRAMUSCULAR
Status: DISPENSED
Start: 2018-11-08 | End: 2018-11-08

## 2018-11-08 RX ORDER — LORAZEPAM 2 MG/ML
1 INJECTION INTRAMUSCULAR ONCE
Status: DISCONTINUED | OUTPATIENT
Start: 2018-11-08 | End: 2018-11-12

## 2018-11-08 RX ORDER — TEMAZEPAM 15 MG/1
15 CAPSULE ORAL NIGHTLY PRN
Status: DISCONTINUED | OUTPATIENT
Start: 2018-11-08 | End: 2018-11-12

## 2018-11-08 RX ORDER — QUETIAPINE 100 MG/1
100 TABLET, FILM COATED ORAL NIGHTLY
Status: DISCONTINUED | OUTPATIENT
Start: 2018-11-08 | End: 2018-11-10

## 2018-11-08 RX ORDER — LORAZEPAM 2 MG/ML
1 INJECTION INTRAMUSCULAR ONCE
Status: COMPLETED | OUTPATIENT
Start: 2018-11-08 | End: 2018-11-08

## 2018-11-08 NOTE — PHYSICAL THERAPY NOTE
Attempted later this am pt still on hold due to his confusion,aggitation and on restraints per RN. Will follow up later this afternoon if appropriate.

## 2018-11-08 NOTE — PLAN OF CARE
Delirium    • Minimize duration of delirium Not Progressing          Pt remains confused/forgetful. Answers all orientation questions correctly but hallucinating.  Dr. Trudi Ramon notified of continued confusion/hallucinations at 2100 when updated with BMP/UA res

## 2018-11-08 NOTE — PROGRESS NOTES
[facility]    Progress Note    Nan Osborne Patient Status:  Inpatient    1940 MRN [id number]   Location [facility] 4W/SW/SE Attending Mario Frarell MD   [facility] Day # 3 PCP Ady Tsai DO       Subjective:   Nan Osborne ALKPHO 136 (H) 11/03/2018    BILT 1.0 11/03/2018    TP 6.3 11/03/2018    AST 22 11/03/2018    ALT 19 11/03/2018    TSH 1.21 11/08/2018       No procedure found.         Ekg 12-lead    Result Date: 11/7/2018  ECG Report  Interpretation  ---------------------

## 2018-11-08 NOTE — PLAN OF CARE
..RRT    *See RRT Documentation Record*    Reason the RRT was called: Increased confusion; unable to answer any questions; rambling incoherently. Assessment of patient leading up to RRT: Pt was confused since yesterday morning.  Has been confused/halluci

## 2018-11-08 NOTE — PLAN OF CARE
This RN accompanied pt down to CT at approximately 0545. Geodon was given at 0500. Pt became physically combative with staff and extremely agitated. Transferred back to floor. Dr. Agustina Cantu notified of RRT and increased agitation. No new orders provided.  [facility]

## 2018-11-08 NOTE — PLAN OF CARE
Delirium    • Minimize duration of delirium Not Progressing        DISCHARGE PLANNING    • Discharge to home or other facility with appropriate resources Not Progressing        PAIN - ADULT    • Verbalizes/displays adequate comfort level or patient's state

## 2018-11-08 NOTE — PROGRESS NOTES
Patient seen in follow up. She was hypotensive probably related to pain medications. He has been disoriented as well. In no acute distress.    11/08/18  0416   BP: 125/58   Pulse: 80   Resp: 18   Temp: 98.7 °F (37.1 °C)       Intake/Output Summary famoTIDine (PEPCID) tab 20 mg 20 mg Oral BID   Or      famoTIDine (PEPCID) injection 20 mg 20 mg Intravenous BID   apixaban (ELIQUIS) tab 2.5 mg 2.5 mg Oral BID   acetaminophen (TYLENOL) tab 650 mg 650 mg Oral Q4H PRN   atorvastatin (LIPITOR) tab 40 mg 40

## 2018-11-08 NOTE — PROGRESS NOTES
[address] Patient Status:  Inpatient    1940 MRN [id number]   Location [facility] 4W/SW/SE Attending Angella Milan MD   [facility] Day # 3 PCP Belton Call,      Subjective:  Post-Operative Day: 3 Status Post right Tota famoTIDine (PEPCID) injection 20 mg 20 mg Intravenous BID   diphenhydrAMINE (BENADRYL) cap/tab 25 mg 25 mg Oral Q4H PRN   Or      DiphenhydrAMINE HCl (BENADRYL) injection 12.5 mg 12.5 mg Intravenous Q4H PRN   apixaban (ELIQUIS) tab 2.5 mg 2.5 mg Oral BID

## 2018-11-08 NOTE — SIGNIFICANT EVENT
ICU nocturnalist    RRT called to room 411. On my arrival, patient attempting to get out of bed, eyes closed but talking continuously, speech tangential, not answering questions appropriately.   As per RN patient was a and O x3 postsurgically however sta

## 2018-11-09 ENCOUNTER — APPOINTMENT (OUTPATIENT)
Dept: CT IMAGING | Facility: HOSPITAL | Age: 78
DRG: 470 | End: 2018-11-09
Attending: HOSPITALIST
Payer: MEDICARE

## 2018-11-09 PROCEDURE — 99233 SBSQ HOSP IP/OBS HIGH 50: CPT | Performed by: OTHER

## 2018-11-09 PROCEDURE — 70450 CT HEAD/BRAIN W/O DYE: CPT | Performed by: HOSPITALIST

## 2018-11-09 RX ORDER — QUETIAPINE 25 MG/1
100 TABLET, FILM COATED ORAL NIGHTLY PRN
Status: DISCONTINUED | OUTPATIENT
Start: 2018-11-09 | End: 2018-11-12

## 2018-11-09 NOTE — CM/SW NOTE
Updates sent to [facility] is checking if they can still accept for rehab, given the recent mental status changes.       Tiff Mg, PREM., [id number]

## 2018-11-09 NOTE — PLAN OF CARE
Safety Risk - Non-Violent Restraints    • Patient will remain free from self-harm Completed          Delirium    • Minimize duration of delirium Progressing        DISCHARGE PLANNING    • Discharge to home or other facility with appropriate resources Progr

## 2018-11-09 NOTE — PROGRESS NOTES
Patient seen in follow up.   VS stable   11/09/18  1345   BP: 120/47   Pulse: 80   Resp: 17   Temp: 99.6 °F (37.6 °C)       Intake/Output Summary (Last 24 hours) at 11/9/2018 1509  Last data filed at 11/9/2018 1456  Gross per 24 hour   Intake 1400 ml atorvastatin (LIPITOR) tab 40 mg 40 mg Oral Daily       Medications Prior to Admission:  atorvastatin 40 MG Oral Tab Take 40 mg by mouth daily. Cholecalciferol (VITAMIN D) 2000 units Oral Cap Take 1 capsule by mouth daily.    venlafaxine (EFFEXOR XR) 150

## 2018-11-09 NOTE — PROGRESS NOTES
[address] Patient Status:  Inpatient    1940 MRN [id number]   Location [facility] 4W/SW/SE Attending [place] Mireles MD   [facility] Day # 4 PCP Harika Cook DO     Subjective:  Post-Operative Day: 4 Status Post right Tota ondansetron HCl (ZOFRAN) injection 4 mg 4 mg Intravenous Q4H PRN   famoTIDine (PEPCID) tab 20 mg 20 mg Oral BID   Or      famoTIDine (PEPCID) injection 20 mg 20 mg Intravenous BID   apixaban (ELIQUIS) tab 2.5 mg 2.5 mg Oral BID   acetaminophen (TYLENOL)

## 2018-11-09 NOTE — PROGRESS NOTES
[facility]    Progress Note    Varsha Inman Patient Status:  Inpatient    1940 MRN [id number]   Location [facility] 4W/SW/SE Attending Jose Orourke MD   [facility] Day # 4 PCP Myra Lema DO       Subjective:   Varsha Inman AST 22 11/03/2018    ALT 19 11/03/2018    TSH 1.21 11/08/2018       No procedure found. Ct Brain Or Head (65965)    Result Date: 11/9/2018  CONCLUSION:  1. No acute intracranial process by noncontrast CT technique.  2. Minor intracranial atheroscleros

## 2018-11-09 NOTE — PHYSICAL THERAPY NOTE
PHYSICAL THERAPY KNEE TREATMENT NOTE - INPATIENT     Room Number: 292/415-F             Presenting Problem: R TKA    Problem List  Active Problems:    Preop testing    Delirium due to another medical condition    Major depressive disorder, single episode, sitting on the side of the bed?: A Lot   How much help from another person does the patient currently need. ..   -   Moving to and from a bed to a chair (including a wheelchair)?: A Lot   -   Need to walk in hospital room?: A Lot   -   Climbing 3-5 steps wi exercise program for ROM/strengthening per the instructions provided in preparation for discharge.    Goal #6  Current Status

## 2018-11-09 NOTE — CONSULTS
[facility]    Report of Consultation    Conchis Guzman Patient Status:  Inpatient    1940 MRN [id number]   Location [facility] 4W/SW/SE Attending Sheron Bay MD   [facility] Day # 3 PCP Manohar Brown DO     Date of Admission: exhibiting disorganized speech and exhibiting distortion in his cognition. Daughter was tearful to hear him talking nonsense. Wife indicated that patient have a habit of drinking 2 glasses of wine every night but drinking more in social activity.   She ER (UROXATRAL) 24 hr tab 10 mg 10 mg Oral Nightly   Venlafaxine HCl ER (EFFEXOR-XR) 24 hr cap 150 mg 150 mg Oral Daily   Cholecalciferol (VITAMIN D) 1000 units tab 2,000 Units 2,000 Units Oral Daily   Normal Saline Flush 0.9 % injection 10 mL 10 mL Maci Young pressure 131/59, pulse 80, temperature 98.4 °F (36.9 °C), temperature source Axillary, resp. rate 18, height 74.75\", weight 222 lb, SpO2 91 %. Mental Status Exam:     The patient is alert and oriented to self but not to place, date or condition.   Well insomnia. 5.  Continue Effexor  mg daily. 6.  Utilize temazepam 50 mg nightly as needed for insomnia. 7.  Communicate with family and provide reassurance.     Orders This Visit:  Orders Placed This Encounter      CBC W Differential W Platelet

## 2018-11-10 PROCEDURE — 99233 SBSQ HOSP IP/OBS HIGH 50: CPT | Performed by: INTERNAL MEDICINE

## 2018-11-10 RX ORDER — MELATONIN
325
Status: DISCONTINUED | OUTPATIENT
Start: 2018-11-10 | End: 2018-11-12

## 2018-11-10 RX ORDER — DOXEPIN HYDROCHLORIDE 50 MG/1
1 CAPSULE ORAL DAILY
Status: DISCONTINUED | OUTPATIENT
Start: 2018-11-10 | End: 2018-11-12

## 2018-11-10 RX ORDER — POTASSIUM CHLORIDE 20 MEQ/1
40 TABLET, EXTENDED RELEASE ORAL EVERY 4 HOURS
Status: DISCONTINUED | OUTPATIENT
Start: 2018-11-10 | End: 2018-11-10

## 2018-11-10 RX ORDER — MIDODRINE HYDROCHLORIDE 5 MG/1
5 TABLET ORAL 3 TIMES DAILY
Status: DISCONTINUED | OUTPATIENT
Start: 2018-11-10 | End: 2018-11-12

## 2018-11-10 RX ORDER — ARIPIPRAZOLE 15 MG/1
40 TABLET ORAL EVERY 4 HOURS
Status: COMPLETED | OUTPATIENT
Start: 2018-11-10 | End: 2018-11-10

## 2018-11-10 RX ORDER — CYANOCOBALAMIN 1000 UG/ML
1000 INJECTION INTRAMUSCULAR; SUBCUTANEOUS ONCE
Status: COMPLETED | OUTPATIENT
Start: 2018-11-10 | End: 2018-11-10

## 2018-11-10 NOTE — PHYSICAL THERAPY NOTE
PHYSICAL THERAPY KNEE TREATMENT NOTE - INPATIENT     Room Number: 856/680-O             Presenting Problem: R TKA    Problem List  Active Problems:    Preop testing    Delirium due to another medical condition    Current moderate episode of major depressiv wheelchair, bedside commode, etc.): A Little   -   Moving from lying on back to sitting on the side of the bed?: A Little   How much help from another person does the patient currently need. ..   -   Moving to and from a bed to a chair (including a wheelcha degrees flexion     Goal #5   Current Status  in progress   Goal #6 Patient independently performs home exercise program for ROM/strengthening per the instructions provided in preparation for discharge.    Goal #6  Current Status I   I

## 2018-11-10 NOTE — OCCUPATIONAL THERAPY NOTE
OCCUPATIONAL THERAPY TREATMENT NOTE - INPATIENT    Room Number: 578/358-Q         Presenting Problem: R TKA     Problem List  Active Problems:    Preop testing    Delirium due to another medical condition    Major depressive disorder, single episode, moder simplification techniques;ADL training;IADL training;Functional transfer training;UE strengthening/ROM; Endurance training;Cognitive reorientation;Patient/Family education;Patient/Family training;Neuromuscluar reeducation; Compensatory technique education continue plan of care, activity promotion, family training   Patient End of Session: Up in chair;Needs met;Call light within reach;RN aware of session/findings; All patient questions and concerns addressed; Alarm set; Family present      OT Goals  Patient james

## 2018-11-10 NOTE — PROGRESS NOTES
The patient seen today for over 35-minute, follow-up evaluation, over 50% counseling and managing treatment plan. Patient seen in the presence of his wife. Medical record reviewed and communicating with RN.     According to RN the patient yesterday contin nightly as needed for insomnia if patient was not able to response to the scheduled 100 mg Seroquel. 6.  Continue Haldol 1 mg IV every 2 hours as needed for agitation. 7.  Patient overall has been exhibiting significant improvement with good prognosis.

## 2018-11-10 NOTE — PROGRESS NOTES
Below note from [facility]. Patient was interviewed and examined. Agree with assessment and plan with edits to the document performed as necessary.     [facility] AND [facility]  Progress Note    Miryam Trimble Patient Status:  Inpatient    1940 MRN [id number] No focal deficits. Neck: No JVD, carotids 2+ no bruits. Cardiac: Regular rate and rhythm, S1, S2 normal, no murmur, rub or gallop. Lungs: Clear without wheezes, rales, rhonchi or dullness. Normal excursions and effort. Abdomen: Soft, non-tender.    Ext haloperidol lactate (HALDOL) 5 MG/ML injection 1 mg 1 mg Intravenous Q2H PRN   temazepam (RESTORIL) cap 15 mg 15 mg Oral Nightly PRN   Alfuzosin HCl ER (UROXATRAL) 24 hr tab 10 mg 10 mg Oral Nightly   Venlafaxine HCl ER (EFFEXOR-XR) 24 hr cap 150 mg 150

## 2018-11-10 NOTE — PROGRESS NOTES
[facility]    Progress Note    Brock Shipley Patient Status:  Inpatient    1940 MRN [id number]   Location [facility] 4W/SW/SE Attending Frances Nation MD   [facility] Day # 5 PCP Mendel Batman, DO       Subjective:   Brock Shipley 11/09/18  1802 11/09/18  2125 11/10/18  0430   BP: 120/47  132/70 130/74   BP Location: Right arm  Right arm Right arm   Pulse: 80  79 79   Resp: 17   18   Temp: 99.6 °F (37.6 °C) 98.1 °F (36.7 °C) 98.4 °F (36.9 °C) 98.9 °F (37.2 °C)   TempSrc: Axillary Or present treatment    Vitamin B12 deficiency  Mental status changes postop. Patient improved today, though vitamin B12 1000 mcg IM ordered due to significant deficiency. Patient not a vegetarian.   He has no symptoms of diarrhea, though will check celiac p

## 2018-11-10 NOTE — PROGRESS NOTES
[address] Patient Status:  Inpatient    1940 MRN [id number]   Location [facility] 4W/SW/SE Attending Ria Jordan MD   [facility] Day # 5 PCP Doy Fee, DO     Subjective:  Post-Operative Day: 5 Status Post right Tota GM/118ML enema 133 mL 1 enema Rectal Once PRN   ondansetron HCl (ZOFRAN) injection 4 mg 4 mg Intravenous Q4H PRN   famoTIDine (PEPCID) tab 20 mg 20 mg Oral BID   Or      famoTIDine (PEPCID) injection 20 mg 20 mg Intravenous BID   apixaban (ELIQUIS) tab 2.5

## 2018-11-11 PROCEDURE — 99233 SBSQ HOSP IP/OBS HIGH 50: CPT | Performed by: OTHER

## 2018-11-11 PROCEDURE — 99232 SBSQ HOSP IP/OBS MODERATE 35: CPT | Performed by: INTERNAL MEDICINE

## 2018-11-11 RX ORDER — MAGNESIUM OXIDE 400 MG (241.3 MG MAGNESIUM) TABLET
400 TABLET ONCE
Status: COMPLETED | OUTPATIENT
Start: 2018-11-11 | End: 2018-11-11

## 2018-11-11 RX ORDER — VIT A/VIT C/VIT E/ZINC/COPPER 2148-113
1 TABLET ORAL 2 TIMES DAILY
COMMUNITY

## 2018-11-11 NOTE — CM/SW NOTE
Addend 1110a:     Yuniel Hernandez stated pt has been accepted at [facility] and will have a bed available for pt.  RN stated that per MD, anticipated discharge Mon 11/12.     --------------------------------------------------------------------

## 2018-11-11 NOTE — PHYSICAL THERAPY NOTE
PHYSICAL THERAPY KNEE TREATMENT NOTE - INPATIENT     Room Number: 809/938-C             Presenting Problem: R TKA    Problem List  Active Problems:    Preop testing    Delirium due to another medical condition    Current moderate episode of major depressiv underneath his heel/. Pt is on track to dc to rehab once medically cleared. DISCHARGE RECOMMENDATIONS  PT Discharge Recommendations: Sub-acute rehabilitation    PLAN  PT Treatment Plan: Bed mobility; Body mechanics; Patient education;Gait training;Rang follow for safety due to low BP.          Exercises AM Session PM Session   Ankle Pumps 20 reps 0 reps   Quad Sets 0 reps 20 reps   Glut Sets 20 reps 0 reps   Hip Abd/Add 0 reps 0 reps   Heel slides 20 reps 0 reps   Saq 0 reps 0 reps   SLR 0 reps 0 reps   S

## 2018-11-11 NOTE — OCCUPATIONAL THERAPY NOTE
OCCUPATIONAL THERAPY TREATMENT NOTE - INPATIENT    Room Number: 415/952-E         Presenting Problem: R TKA     Problem List  Active Problems:    Preop testing    Delirium due to another medical condition    Current moderate episode of major depressive dis currently need…  -   Putting on and taking off regular lower body clothing?: A Little  -   Bathing (including washing, rinsing, drying)?: A Little  -   Toileting, which includes using toilet, bedpan or urinal? : A Little  -   Putting on and taking off regu

## 2018-11-11 NOTE — PROGRESS NOTES
The patient seen today for over 35-minute, follow-up evaluation, over 50% counseling and managing treatment plan. Patient seen in the presence of his daughter. Medical record reviewed and communicating with RN.     According to RN the patient has been doi the case for patient improved. 7.  Patient is appropriate psychologically to transfer to inpatient skilled nursing facility for rehab.         Tres Infante MD  11/11/2018

## 2018-11-11 NOTE — PROGRESS NOTES
[facility]    Progress Note    Aleyda Atkinson Patient Status:  Inpatient    1940 MRN [id number]   Location [facility] 4W/SW/SE Attending Madison Wong MD   [facility] Day # 6 PCP Mesha Blevins DO       Subjective:   Aleyda Atkinson 24 hours) at 11/11/2018 1029  Last data filed at 11/11/2018 0559  Gross per 24 hour   Intake 900 ml   Output 750 ml   Net 150 ml       General appearance: Well-developed well-nourished male in no acute distress. Still orthostatic, though less than prior. HGB 10.6 (L) 11/11/2018    HCT 31.8 (L) 11/11/2018     11/11/2018    CREATSERUM 1.08 11/11/2018    BUN 15 11/11/2018     11/11/2018    K 3.9 11/11/2018     11/11/2018    CO2 23 11/11/2018     (H) 11/11/2018    CA 8.1 (L) 11/11/201

## 2018-11-12 VITALS
BODY MASS INDEX: 27.89 KG/M2 | TEMPERATURE: 99 F | HEIGHT: 74.75 IN | RESPIRATION RATE: 16 BRPM | WEIGHT: 222 LBS | DIASTOLIC BLOOD PRESSURE: 69 MMHG | OXYGEN SATURATION: 94 % | SYSTOLIC BLOOD PRESSURE: 142 MMHG | HEART RATE: 84 BPM

## 2018-11-12 RX ORDER — DOXEPIN HYDROCHLORIDE 50 MG/1
1 CAPSULE ORAL DAILY
Qty: 30 TABLET | Refills: 0 | Status: SHIPPED | OUTPATIENT
Start: 2018-11-12

## 2018-11-12 RX ORDER — MIDODRINE HYDROCHLORIDE 5 MG/1
5 TABLET ORAL 3 TIMES DAILY
Qty: 90 TABLET | Refills: 0 | Status: SHIPPED | OUTPATIENT
Start: 2018-11-12

## 2018-11-12 RX ORDER — MELATONIN
325
Qty: 30 TABLET | Refills: 0 | Status: SHIPPED | OUTPATIENT
Start: 2018-11-12

## 2018-11-12 NOTE — PHYSICAL THERAPY NOTE
PHYSICAL THERAPY KNEE TREATMENT NOTE - INPATIENT     Room Number: 543/825-W             Presenting Problem: R TKA    Problem List  Active Problems:    Preop testing    Delirium due to another medical condition    Current moderate episode of major depressiv mechanics; Relaxation;Repositioning    BALANCE  Static Sitting: Good  Dynamic Sitting: Fair +  Static Standing: Fair -  Dynamic Standing: Fair -    ACTIVITY TOLERANCE                         O2 WALK                  AM-PAC '6-Clicks' INPATIENT SHORT FORM - Status  NT    Goal #2 Patient is able to demonstrate transfers Sit to/from Stand at assistance level: Supervision      Goal #2  Current Status Min A with the RW    Goal #3 Patient is able to ambulate 300 feet with assistive device at assistance level: SBA

## 2018-11-12 NOTE — CM/SW NOTE
Patient has been cleared for DC today, 11.12.18. Per Malgorzata Mckenzie with [facility], 11 am transfer time would work. Confirmed with family. Daughter will transport.     Report Yariel Sanchez, MSW., [phone]

## 2018-11-12 NOTE — DISCHARGE SUMMARY
[facility]    PATIENT'S NAME: [facility]   ATTENDING PHYSICIAN: Freya Ying.  Ramila Oh MD   PATIENT ACCOUNT#:   [id number]    LOCATION:  [address] [place] [address] [address] #:   [id number]       YOB: 1940  ADMISSION DATE:       11/05/2018 chest pain. No shortness of breath. No nausea, vomiting, or diarrhea. No abdominal pain. No cough. No sore throats. No dysuria. No rashes.   The patient does admit to being legally blind with his better eye the left eye, being 20/200 on the right eye 1000 mcg sublingual daily; iron sulfate 325 mg daily; magnesium oxide 400 mg daily; and midodrine 5 mg t.i.d. It was recommended that the patient continue Seroquel by Psychiatry. However, the patient and his family are refusing to continue the Seroquel.

## 2018-11-12 NOTE — PROGRESS NOTES
[facility]    Progress Note    Gail Velasco Patient Status:  Inpatient    1940 MRN [id number]   Location [facility] 4W/SW/SE Attending Hernando Garcia MD   [facility] Day # 7 PCP Mirna Alex DO       Subjective:   Gail Velasco daily, and Midodrin 5 mg 3 times daily. Patient and family refusing seroquel.       Results:     Lab Results   Component Value Date    WBC 8.5 11/11/2018    HGB 10.6 (L) 11/11/2018    HCT 31.8 (L) 11/11/2018     11/11/2018    CREATSERUM 1.08 11/11/2

## 2022-06-14 ENCOUNTER — ORDER TRANSCRIPTION (OUTPATIENT)
Dept: PHYSICAL THERAPY | Facility: HOSPITAL | Age: 82
End: 2022-06-14

## 2022-06-14 DIAGNOSIS — M47.812 CERVICAL SPONDYLOSIS WITHOUT MYELOPATHY: Primary | ICD-10-CM

## (undated) DEVICE — 2T11 #2 PDO 36 X 36: Brand: 2T11 #2 PDO 36 X 36

## (undated) DEVICE — BIPOLAR SEALER 23-301-1 AQM MBS: Brand: AQUAMANTYS™

## (undated) DEVICE — 20 ML SYRINGE LUER-LOCK TIP: Brand: MONOJECT

## (undated) DEVICE — DUAL CUT SAGITTAL BLADE

## (undated) DEVICE — STERILE LATEX POWDER-FREE SURGICAL GLOVESWITH NITRILE COATING: Brand: PROTEXIS

## (undated) DEVICE — SOL  .9 3000ML

## (undated) DEVICE — SUTURE PDS II 2-0 CP

## (undated) DEVICE — SUTURE VLOC 90 3-0 9\" 2044

## (undated) DEVICE — TRAY SRGPRP PVP IOD WT SCRB SM

## (undated) DEVICE — TOTAL KNEE: Brand: MEDLINE INDUSTRIES, INC.

## (undated) DEVICE — DRAPE SHEET LG

## (undated) DEVICE — BLADE SAW SAGITTAL 19.5

## (undated) DEVICE — 450 ML BOTTLE OF 0.05% CHLORHEXIDINE GLUCONATE IN 99.95% STERILE WATER FOR IRRIGATION, USP AND APPLICATOR.: Brand: IRRISEPT ANTIMICROBIAL WOUND LAVAGE

## (undated) DEVICE — CHLORAPREP 26ML APPLICATOR

## (undated) DEVICE — BOWL CEMENT MIX QUICK-VAC

## (undated) DEVICE — NEEDLE HPO 18GA 1.5IN ECLPS

## (undated) DEVICE — ZIMMER® STERILE DISPOSABLE TOURNIQUET CUFF WITH PLC, DUAL PORT, SINGLE BLADDER, 30 IN. (76 CM)

## (undated) DEVICE — SUTURE MONOCRYL 2-0 SH

## (undated) DEVICE — CONTAINER SPEC STR 4OZ GRY LID

## (undated) DEVICE — STANDARD HYPODERMIC NEEDLE,POLYPROPYLENE HUB: Brand: MONOJECT

## (undated) DEVICE — SOL  .9 1000ML BTL

## (undated) DEVICE — BATTERY

## (undated) DEVICE — DERMABOND LIQUID ADHESIVE

## (undated) DEVICE — SOL  .9 1000ML BAG

## (undated) DEVICE — BANDAGE ROLL,100% COTTON, 6 PLY, LARGE: Brand: KERLIX

## (undated) DEVICE — T5 HOOD WITH PEEL AWAY FACE SHIELD

## (undated) DEVICE — GAUZE SPONGES,12 PLY: Brand: CURITY

## (undated) DEVICE — Device

## (undated) NOTE — IP AVS SNAPSHOT
Patient Demographics     Address  [address] [place] [address] Phone  [phone] [facility]) *Preferred*  [phone] [facility]) E-mail Address  [email or web address]      Emergency Contact(s)     Name Relation Home Work Mobile    Randal Spouse 7 · To experience mild back pain or soreness for a day or two if you had spinal or epidural anesthesia. · If you had laparoscopic surgery, to feel shoulder pain or discomfort on the day of surgery.    · For some patients to have nausea after surgery/anesthe in clinic for the first post-operative visit. If there is any leakage, a bandage may be placed on the incision with an ACE wrap on the knee for compression. Showering is allowed five days after your surgery.  You should keep the incision covered for the f lying down. You may lie on the floor and place your foot up on the couch to get the leg high enough. For hip patients, elevation may include your ankle to thigh. Compression in the form of an ACE wrap around your knee can also decrease swelling.  You may us FAQ  1. When can I drive? Every patient tolerates and recovers from surgery differently; therefore we cannot give you an exact timeframe.   You need to have excellent leg control and strength and you must be completely free of taking na precautions. If you have any specific questions, please ask Dr. Mj Cuba. 7. What do I do about airport metal detectors? Inform the TSA you have a joint replacement.  Depending on the type of metal detector, they will most likely still wand or pat you down clinic for the first post-operative visit. If there is leakage, a bandage may be placed on the incision with an ACE wrap on the knee for compression. Follow-up Information     Susan Rose MD In 2 weeks.     Specialties:  SURGERY, [facility], [address] 415-415-A - MAR ACTION REPORT  (last 24 hrs)    ** SITE UNKNOWN **     Order ID Medication Name Action Time Action Reason Comments    [id number] Alfuzosin HCl ER (UROXATRAL) 24 hr tab 10 mg 11/11/18 2121 Given      [id number] Cholecalciferol (VITAMIN D) 1000 Ordering provider:  Paulina Bojorquez MD  11/11/18 5089 Resulting lab:  [facility]    Specimen Information    Type Source Collected On   Blood — 11/12/18 8112          Components    Component Value Reference Range Flag Lab   Magnesium 1.8 1.8 - 2.5 definitive treatment with a right total knee arthroplasty. PAST MEDICAL HISTORY:  Significant for heart block, wet macular degeneration, hypercholesterolemia, history of diverticulitis, and anxiety/depression.     PAST SURGICAL HISTORY:  He has had a pac 3.   Anxiety/depression. 4.   Hypercholesterolemia. The patient underwent a right total knee arthroplasty. Labs were ordered. Anticoagulants per Orthopedics. We will follow.     Dictated By Susannah Woodard MD  d: 11/05/2018 19:07:59  t: 11/05/2018 post surgery indicate psych consult for evaluation and advice. The patient today seen in the presence of his family. Report from staff and attending that patient was combative last night and paranoid about the staff.   Patient was in soft arms restrain • EYE SURGERY     • KNEE TOTAL REPLACEMENT Right 11/5/2018    Performed by Steven Hernandez MD at [address] [place] [address] Dr   • OTHER      Dupuytrens both hands[GA.2]       Family [address] 1]  Family History   Problem Relation Age of Onset   • Cancer Mother         liver[ Medications Prior to Admission:  atorvastatin 40 MG Oral Tab Take 40 mg by mouth daily. Cholecalciferol (VITAMIN D) 2000 units Oral Cap Take 1 capsule by mouth daily. venlafaxine (EFFEXOR XR) 150 MG Oral Capsule SR 24 Hr Take 150 mg by mouth daily. [GA. The patient has been exhibiting impairment in his insight and ability of making appropriate judgment in his condition.   Otherwise the patient did not exhibit any physical neurological deficit    Impression:   Impression:      Delirium due to other conditio • TraMADol HCl 50 MG Oral Tab 60 tablet 0     Sig: Take 1 tablet (50 mg total) by mouth every 12 (twelve) hours as needed. [GA.2]           Luz Elena Fragoso MD  11/8/2018[GA.1]        Electronically signed by Scott Yadav MD on 11/8/2018  8:08 PM   Att PAST MEDICAL HISTORY:  Significant for heart block, wet macular degeneration, hypercholesterolemia, history of diverticulitis, and anxiety/depression.     PAST SURGICAL HISTORY:  He has had a pacemaker with replacement x3, status post cholecystectomy, statu He tolerated the procedure, but postoperatively, he did become anemic. He did not require any transfusions. He also had acute urinary retention, had to be straight catheterized and a Gavin placed. Eventually the Gavin was removed.   Also, the patient bec Room Number: 415/415-A             Presenting Problem: R TKA    Problem List  Active Problems:    Preop testing    Delirium due to another medical condition    Current moderate episode of major depressive disorder (HCC)    Orthostatic hypotension    Joseline Castellon his heel/. Pt is on track to dc to rehab once medically cleared. [RM.2]       DISCHARGE RECOMMENDATIONS  PT Discharge Recommendations: Sub-acute rehabilitation    PLAN  PT Treatment Plan: Bed mobility; Body mechanics; Patient education;Gait training;Range of Comment : pt AMB with chair follow for safety due to low BP.          Exercises AM Session PM Session   Ankle Pumps 20 reps 0 reps   Quad Sets 0 reps[RM.1] 2[RM.2]0 reps   Glut Sets 20 reps 0 reps   Hip Abd/Add 0 reps 0 reps   Heel slides 20 reps 0 reps   S Author:  Anna Castle PTA Service:  Rehab Author Type:  Physical Therapy Assistant    Filed:  11/11/2018  1:17 PM Date of Service:  11/11/2018  1:09 PM Status:  Signed    :  Anna Castle PTA (Physical Therapy Assistant)    Related Notes: training;Range of motion;Strengthening;Transfer training;Balance training    SUBJECTIVE  Pt was agreeable to therapy session.      OBJECTIVE  Precautions: (+ Orthostatic)    WEIGHT BEARING STATUS  Weight Bearing Restriction: R lower extremity        R Lower SLR 0 reps 0 reps   Sitting Knee Flexion 20 reps 0 reps   Standing heel/toe raises 0 reps 0 reps   Standing knee flexion 0 reps 0 reps   Seated marches 20 reps 0 reps         Knee ROM           Patient End of Session: Up in chair;Needs met;Call light withi Delirium due to another medical condition    Current moderate episode of major depressive disorder (HCC)    Orthostatic hypotension    Osteoarthritis of right knee    Vitamin B12 deficiency    Iron deficiency anemia due to chronic blood loss[CK. 2]      A -   Moving from lying on back to sitting on the side of the bed?: A Little[CK. 2]   How much help from another person does the patient currently need. .. [CK.1]   -   Moving to and from a bed to a chair (including a wheelchair)?: A Little   -   Need to walk i Goal #5  AROM 0 degrees extension to 95 degrees flexion     Goal #5   Current Status  in progress   Goal #6 Patient independently performs home exercise program for ROM/strengthening per the instructions provided in preparation for discharge.    Goal #6  Cu Weight Bearing Restriction: R lower extremity        R Lower Extremity: Weight Bearing as Tolerated       PAIN ASSESSMENT   Rating: Unable to rate  Location: R knee  Management Techniques: Activity promotion; Body mechanics; Relaxation;Repositioning    Dominique Hansen Patient End of Session: In bed;Needs met;Call light within reach;RN aware of session/findings; All patient questions and concerns addressed;SCDs in place; Alarm set; Family present    CURRENT GOALS[RM.1]    Goals to be met by: 11/20/18  Patient Goal Patient's Current moderate episode of major depressive disorder (HCC)    Orthostatic hypotension    Osteoarthritis of right knee    Vitamin B12 deficiency    Iron deficiency anemia due to chronic blood loss    Magnesium deficiency      ASSESSMENT   Pt seen for OT -   Toileting, which includes using toilet, bedpan or urinal? : A Little  -   Putting on and taking off regular upper body clothing?: None  -   Taking care of personal grooming such as brushing teeth?: None  -   Eating meals?: None    AM-PAC Score:  Score: OCCUPATIONAL THERAPY TREATMENT NOTE - INPATIENT    Room Number: 411/411-A[KR.1]         Presenting Problem: R TKA[KR.2]     Problem List[KR. 1]  Active Problems:    Preop testing    Delirium due to another medical condition    Major depressive disorder, sin OT Treatment Plan: Balance activities; Energy conservation/work simplification techniques;ADL training;IADL training;Functional transfer training;UE strengthening/ROM; Endurance training;Cognitive reorientation;Patient/Family education;Patient/Family trainin Bathing: mod d/t dizziness in standing   Toileting: mod a   Upper Body Dressing: NT  Lower Body Dressing: mod to max a     Education Provided: Role of OT, continue plan of care, activity promotion, family training[KR.1]   Patient End of Session: Up in [place]

## (undated) NOTE — IP AVS SNAPSHOT
[facility]            (For Outpatient Use Only) Initial Admit Date: 11/5/2018   Inpt/Obs Admit Date: Inpt: 11/5/18 / Obs: N/A   Discharge Date:    Karen Vogel:  [de-identified]   MRN: [de-identified]   CSN: [id number]        ENCOUNTER  Patient Class Subscriber ID:  Pt Rel to Subscriber:    Hospital Account Financial Class: Medicare    November 12, 2018